# Patient Record
Sex: FEMALE | Race: WHITE | Employment: OTHER | URBAN - METROPOLITAN AREA
[De-identification: names, ages, dates, MRNs, and addresses within clinical notes are randomized per-mention and may not be internally consistent; named-entity substitution may affect disease eponyms.]

---

## 2023-01-01 ENCOUNTER — TELEPHONE (OUTPATIENT)
Dept: PRIMARY CARE | Facility: CLINIC | Age: 88
End: 2023-01-01
Payer: COMMERCIAL

## 2023-01-01 ENCOUNTER — LAB REQUISITION (OUTPATIENT)
Dept: LAB | Facility: HOSPITAL | Age: 88
End: 2023-01-01
Payer: COMMERCIAL

## 2023-01-01 ENCOUNTER — DOCUMENTATION (OUTPATIENT)
Dept: PRIMARY CARE | Facility: CLINIC | Age: 88
End: 2023-01-01
Payer: COMMERCIAL

## 2023-01-01 ENCOUNTER — APPOINTMENT (OUTPATIENT)
Dept: PRIMARY CARE | Facility: CLINIC | Age: 88
End: 2023-01-01
Payer: COMMERCIAL

## 2023-01-01 ENCOUNTER — LAB (OUTPATIENT)
Dept: LAB | Facility: LAB | Age: 88
End: 2023-01-01
Payer: COMMERCIAL

## 2023-01-01 ENCOUNTER — OFFICE VISIT (OUTPATIENT)
Dept: PRIMARY CARE | Facility: CLINIC | Age: 88
End: 2023-01-01
Payer: COMMERCIAL

## 2023-01-01 ENCOUNTER — HOSPITAL ENCOUNTER (OUTPATIENT)
Dept: DATA CONVERSION | Facility: HOSPITAL | Age: 88
End: 2023-05-15
Attending: UROLOGY | Admitting: UROLOGY
Payer: COMMERCIAL

## 2023-01-01 VITALS
OXYGEN SATURATION: 97 % | WEIGHT: 157.8 LBS | HEART RATE: 82 BPM | TEMPERATURE: 97.7 F | RESPIRATION RATE: 16 BRPM | DIASTOLIC BLOOD PRESSURE: 62 MMHG | SYSTOLIC BLOOD PRESSURE: 134 MMHG | BODY MASS INDEX: 27.09 KG/M2

## 2023-01-01 VITALS
SYSTOLIC BLOOD PRESSURE: 144 MMHG | HEART RATE: 89 BPM | BODY MASS INDEX: 27.46 KG/M2 | OXYGEN SATURATION: 95 % | RESPIRATION RATE: 16 BRPM | DIASTOLIC BLOOD PRESSURE: 54 MMHG | TEMPERATURE: 96.8 F | WEIGHT: 160 LBS

## 2023-01-01 VITALS — HEIGHT: 62 IN | WEIGHT: 154.98 LBS | BODY MASS INDEX: 28.52 KG/M2

## 2023-01-01 DIAGNOSIS — E11.311 TYPE 2 DIABETES MELLITUS WITH RETINOPATHY AND MACULAR EDEMA, WITHOUT LONG-TERM CURRENT USE OF INSULIN, UNSPECIFIED LATERALITY, UNSPECIFIED RETINOPATHY SEVERITY (MULTI): ICD-10-CM

## 2023-01-01 DIAGNOSIS — C67.9 MALIGNANT NEOPLASM OF BLADDER, UNSPECIFIED (MULTI): ICD-10-CM

## 2023-01-01 DIAGNOSIS — R31.9 HEMATURIA, UNSPECIFIED: ICD-10-CM

## 2023-01-01 DIAGNOSIS — Z00.00 HEALTH MAINTENANCE EXAMINATION: ICD-10-CM

## 2023-01-01 DIAGNOSIS — E11.9 TYPE 2 DIABETES MELLITUS WITHOUT COMPLICATION, WITHOUT LONG-TERM CURRENT USE OF INSULIN (MULTI): Primary | ICD-10-CM

## 2023-01-01 DIAGNOSIS — R91.8 LUNG MASS: Primary | ICD-10-CM

## 2023-01-01 DIAGNOSIS — I10 ESSENTIAL HYPERTENSION: ICD-10-CM

## 2023-01-01 DIAGNOSIS — R30.0 DYSURIA: ICD-10-CM

## 2023-01-01 DIAGNOSIS — Z00.00 HEALTHCARE MAINTENANCE: ICD-10-CM

## 2023-01-01 DIAGNOSIS — N39.0 URINARY TRACT INFECTION, SITE NOT SPECIFIED: ICD-10-CM

## 2023-01-01 DIAGNOSIS — E11.9 TYPE 2 DIABETES MELLITUS WITHOUT COMPLICATION, WITHOUT LONG-TERM CURRENT USE OF INSULIN (MULTI): ICD-10-CM

## 2023-01-01 DIAGNOSIS — R31.0 GROSS HEMATURIA: Primary | ICD-10-CM

## 2023-01-01 DIAGNOSIS — I10 WHITE COAT SYNDROME WITH HYPERTENSION: Primary | ICD-10-CM

## 2023-01-01 DIAGNOSIS — R31.0 GROSS HEMATURIA: Primary | Chronic | ICD-10-CM

## 2023-01-01 DIAGNOSIS — K64.0 GRADE I HEMORRHOIDS: Primary | ICD-10-CM

## 2023-01-01 DIAGNOSIS — R30.0 DYSURIA: Primary | ICD-10-CM

## 2023-01-01 DIAGNOSIS — C68.9 UROTHELIAL CARCINOMA (MULTI): Primary | ICD-10-CM

## 2023-01-01 DIAGNOSIS — E11.8 TYPE 2 DIABETES MELLITUS WITH UNSPECIFIED COMPLICATIONS (MULTI): ICD-10-CM

## 2023-01-01 DIAGNOSIS — N21.0 CALCULUS IN BLADDER: ICD-10-CM

## 2023-01-01 DIAGNOSIS — Z01.818 PRE-OP EVALUATION: Primary | ICD-10-CM

## 2023-01-01 DIAGNOSIS — R54 ADVANCED AGE: ICD-10-CM

## 2023-01-01 DIAGNOSIS — D49.4 NEOPLASM OF UNSPECIFIED BEHAVIOR OF BLADDER: ICD-10-CM

## 2023-01-01 DIAGNOSIS — E11.29 TYPE 2 DIABETES MELLITUS WITH OTHER KIDNEY COMPLICATION, UNSPECIFIED WHETHER LONG TERM INSULIN USE (MULTI): ICD-10-CM

## 2023-01-01 DIAGNOSIS — I10 ESSENTIAL (PRIMARY) HYPERTENSION: ICD-10-CM

## 2023-01-01 DIAGNOSIS — R35.0 FREQUENCY OF URINATION: Primary | ICD-10-CM

## 2023-01-01 DIAGNOSIS — R93.41 ABNORMAL RADIOLOGIC FINDINGS ON DIAGNOSTIC IMAGING OF RENAL PELVIS, URETER, OR BLADDER: ICD-10-CM

## 2023-01-01 DIAGNOSIS — Z00.00 ANNUAL PHYSICAL EXAM: Primary | ICD-10-CM

## 2023-01-01 DIAGNOSIS — R31.0 GROSS HEMATURIA: Chronic | ICD-10-CM

## 2023-01-01 DIAGNOSIS — I10 ESSENTIAL HYPERTENSION: Primary | ICD-10-CM

## 2023-01-01 LAB
ABO GROUP (TYPE) IN BLOOD: NORMAL
ACTIVATED PARTIAL THROMBOPLASTIN TIME IN PPP BY COAGULATION ASSAY: 30 SEC (ref 26–39)
ACTIVATED PARTIAL THROMBOPLASTIN TIME IN PPP BY COAGULATION ASSAY: 33 SEC (ref 27–38)
ALANINE AMINOTRANSFERASE (SGPT) (U/L) IN SER/PLAS: 14 U/L (ref 7–45)
ALBUMIN (G/DL) IN SER/PLAS: 4.3 G/DL (ref 3.4–5)
ALKALINE PHOSPHATASE (U/L) IN SER/PLAS: 53 U/L (ref 33–136)
ANION GAP IN SER/PLAS: 12 MMOL/L (ref 10–20)
ANION GAP IN SER/PLAS: 12 MMOL/L (ref 10–20)
ANION GAP IN SER/PLAS: 17 MMOL/L (ref 10–20)
ANTIBODY SCREEN: NORMAL
APPEARANCE UR: ABNORMAL
APPEARANCE, URINE: NORMAL
ASPARTATE AMINOTRANSFERASE (SGOT) (U/L) IN SER/PLAS: 19 U/L (ref 9–39)
BACTERIA UR CULT: NORMAL
BACTERIA, URINE: ABNORMAL /HPF
BILIRUB UR STRIP.AUTO-MCNC: NEGATIVE MG/DL
BILIRUBIN TOTAL (MG/DL) IN SER/PLAS: 0.4 MG/DL (ref 0–1.2)
BUDDING YEAST, URINE: PRESENT /HPF
CALCIUM (MG/DL) IN SER/PLAS: 9.1 MG/DL (ref 8.6–10.3)
CALCIUM (MG/DL) IN SER/PLAS: 9.3 MG/DL (ref 8.6–10.3)
CALCIUM (MG/DL) IN SER/PLAS: 9.9 MG/DL (ref 8.6–10.3)
CARBON DIOXIDE, TOTAL (MMOL/L) IN SER/PLAS: 25 MMOL/L (ref 21–32)
CARBON DIOXIDE, TOTAL (MMOL/L) IN SER/PLAS: 25 MMOL/L (ref 21–32)
CARBON DIOXIDE, TOTAL (MMOL/L) IN SER/PLAS: 27 MMOL/L (ref 21–32)
CHLORIDE (MMOL/L) IN SER/PLAS: 100 MMOL/L (ref 98–107)
CHLORIDE (MMOL/L) IN SER/PLAS: 98 MMOL/L (ref 98–107)
CHLORIDE (MMOL/L) IN SER/PLAS: 98 MMOL/L (ref 98–107)
COLOR UR: ABNORMAL
COLOR, URINE: NORMAL
COMPLETE PATHOLOGY REPORT: NORMAL
CONVERTED CLINICAL DIAGNOSIS-HISTORY: NORMAL
CONVERTED FINAL DIAGNOSIS: NORMAL
CONVERTED FINAL REPORT PDF LINK TO COPY AND PASTE: NORMAL
CONVERTED GROSS DESCRIPTION: NORMAL
CREATININE (MG/DL) IN SER/PLAS: 1.21 MG/DL (ref 0.5–1.05)
CREATININE (MG/DL) IN SER/PLAS: 1.29 MG/DL (ref 0.5–1.05)
CREATININE (MG/DL) IN SER/PLAS: 1.36 MG/DL (ref 0.5–1.05)
ERYTHROCYTE DISTRIBUTION WIDTH (RATIO) BY AUTOMATED COUNT: 14.1 % (ref 11.5–14.5)
ERYTHROCYTE DISTRIBUTION WIDTH (RATIO) BY AUTOMATED COUNT: 14.4 % (ref 11.5–14.5)
ERYTHROCYTE DISTRIBUTION WIDTH (RATIO) BY AUTOMATED COUNT: 15.1 % (ref 11.5–14.5)
ERYTHROCYTE DISTRIBUTION WIDTH (RATIO) BY AUTOMATED COUNT: 15.2 % (ref 11.5–14.5)
ERYTHROCYTE MEAN CORPUSCULAR HEMOGLOBIN CONCENTRATION (G/DL) BY AUTOMATED: 29.5 G/DL (ref 32–36)
ERYTHROCYTE MEAN CORPUSCULAR HEMOGLOBIN CONCENTRATION (G/DL) BY AUTOMATED: 29.9 G/DL (ref 32–36)
ERYTHROCYTE MEAN CORPUSCULAR HEMOGLOBIN CONCENTRATION (G/DL) BY AUTOMATED: 29.9 G/DL (ref 32–36)
ERYTHROCYTE MEAN CORPUSCULAR HEMOGLOBIN CONCENTRATION (G/DL) BY AUTOMATED: 30.1 G/DL (ref 32–36)
ERYTHROCYTE MEAN CORPUSCULAR VOLUME (FL) BY AUTOMATED COUNT: 73 FL (ref 80–100)
ERYTHROCYTE MEAN CORPUSCULAR VOLUME (FL) BY AUTOMATED COUNT: 79 FL (ref 80–100)
ERYTHROCYTE MEAN CORPUSCULAR VOLUME (FL) BY AUTOMATED COUNT: 80 FL (ref 80–100)
ERYTHROCYTE MEAN CORPUSCULAR VOLUME (FL) BY AUTOMATED COUNT: 84 FL (ref 80–100)
ERYTHROCYTES (10*6/UL) IN BLOOD BY AUTOMATED COUNT: 3.97 X10E12/L (ref 4–5.2)
ERYTHROCYTES (10*6/UL) IN BLOOD BY AUTOMATED COUNT: 4.1 X10E12/L (ref 4–5.2)
ERYTHROCYTES (10*6/UL) IN BLOOD BY AUTOMATED COUNT: 4.23 X10E12/L (ref 4–5.2)
ERYTHROCYTES (10*6/UL) IN BLOOD BY AUTOMATED COUNT: 4.3 X10E12/L (ref 4–5.2)
GFR FEMALE: 37 ML/MIN/1.73M2
GFR FEMALE: 39 ML/MIN/1.73M2
GFR FEMALE: 42 ML/MIN/1.73M2
GLUCOSE (MG/DL) IN SER/PLAS: 254 MG/DL (ref 74–99)
GLUCOSE (MG/DL) IN SER/PLAS: 266 MG/DL (ref 74–99)
GLUCOSE (MG/DL) IN SER/PLAS: 277 MG/DL (ref 74–99)
GLUCOSE UR STRIP.AUTO-MCNC: NEGATIVE MG/DL
HEMATOCRIT (%) IN BLOOD BY AUTOMATED COUNT: 28.8 % (ref 36–46)
HEMATOCRIT (%) IN BLOOD BY AUTOMATED COUNT: 32.6 % (ref 36–46)
HEMATOCRIT (%) IN BLOOD BY AUTOMATED COUNT: 33.5 % (ref 36–46)
HEMATOCRIT (%) IN BLOOD BY AUTOMATED COUNT: 36.1 % (ref 36–46)
HEMOGLOBIN (G/DL) IN BLOOD: 10 G/DL (ref 12–16)
HEMOGLOBIN (G/DL) IN BLOOD: 10.8 G/DL (ref 12–16)
HEMOGLOBIN (G/DL) IN BLOOD: 8.5 G/DL (ref 12–16)
HEMOGLOBIN (G/DL) IN BLOOD: 9.8 G/DL (ref 12–16)
INR IN PPP BY COAGULATION ASSAY: 1.1 (ref 0.9–1.1)
INR IN PPP BY COAGULATION ASSAY: 1.1 (ref 0.9–1.1)
KETONES UR STRIP.AUTO-MCNC: ABNORMAL MG/DL
LEUKOCYTE ESTERASE UR QL STRIP.AUTO: ABNORMAL
LEUKOCYTES (10*3/UL) IN BLOOD BY AUTOMATED COUNT: 11.3 X10E9/L (ref 4.4–11.3)
LEUKOCYTES (10*3/UL) IN BLOOD BY AUTOMATED COUNT: 8 X10E9/L (ref 4.4–11.3)
LEUKOCYTES (10*3/UL) IN BLOOD BY AUTOMATED COUNT: 8.3 X10E9/L (ref 4.4–11.3)
LEUKOCYTES (10*3/UL) IN BLOOD BY AUTOMATED COUNT: 8.8 X10E9/L (ref 4.4–11.3)
MUCUS, URINE: ABNORMAL /LPF
NITRITE UR QL STRIP.AUTO: NEGATIVE
NRBC (PER 100 WBCS) BY AUTOMATED COUNT: 0 /100 WBC (ref 0–0)
NRBC (PER 100 WBCS) BY AUTOMATED COUNT: 0 /100 WBC (ref 0–0)
PH UR STRIP.AUTO: 6 [PH]
PLATELETS (10*3/UL) IN BLOOD AUTOMATED COUNT: 284 X10E9/L (ref 150–450)
PLATELETS (10*3/UL) IN BLOOD AUTOMATED COUNT: 298 X10E9/L (ref 150–450)
PLATELETS (10*3/UL) IN BLOOD AUTOMATED COUNT: 300 X10E9/L (ref 150–450)
PLATELETS (10*3/UL) IN BLOOD AUTOMATED COUNT: 321 X10E9/L (ref 150–450)
POCT GLUCOSE: 231 MG/DL (ref 74–99)
POTASSIUM (MMOL/L) IN SER/PLAS: 4.6 MMOL/L (ref 3.5–5.3)
PROT UR STRIP.AUTO-MCNC: ABNORMAL MG/DL
PROTEIN TOTAL: 7.3 G/DL (ref 6.4–8.2)
PROTHROMBIN TIME (PT) IN PPP BY COAGULATION ASSAY: 12.1 SEC (ref 9.8–12.8)
PROTHROMBIN TIME (PT) IN PPP BY COAGULATION ASSAY: 12.4 SEC (ref 9.8–13.4)
RBC # UR STRIP.AUTO: ABNORMAL /UL
RBC #/AREA URNS AUTO: >20 /HPF
RBC, URINE: >182 /HPF (ref 0–5)
RBC, URINE: >182 /HPF (ref 0–5)
RH FACTOR: NORMAL
SODIUM (MMOL/L) IN SER/PLAS: 132 MMOL/L (ref 136–145)
SODIUM (MMOL/L) IN SER/PLAS: 132 MMOL/L (ref 136–145)
SODIUM (MMOL/L) IN SER/PLAS: 135 MMOL/L (ref 136–145)
SP GR UR STRIP.AUTO: 1.02
THYROTROPIN (MIU/L) IN SER/PLAS BY DETECTION LIMIT <= 0.05 MIU/L: 1.85 MIU/L (ref 0.44–3.98)
UREA NITROGEN (MG/DL) IN SER/PLAS: 22 MG/DL (ref 6–23)
UREA NITROGEN (MG/DL) IN SER/PLAS: 24 MG/DL (ref 6–23)
UREA NITROGEN (MG/DL) IN SER/PLAS: 31 MG/DL (ref 6–23)
URINE CULTURE: NO GROWTH
URINE CULTURE: NORMAL
UROBILINOGEN UR STRIP.AUTO-MCNC: <2 MG/DL
UROBILINOGEN, URINE: NORMAL MG/DL (ref 0–1.9)
WBC #/AREA URNS AUTO: >50 /HPF
WBC CLUMPS #/AREA URNS AUTO: ABNORMAL /HPF
WBC, URINE: >182 /HPF (ref 0–5)
WBC, URINE: ABNORMAL /HPF (ref 0–5)

## 2023-01-01 PROCEDURE — 36415 COLL VENOUS BLD VENIPUNCTURE: CPT

## 2023-01-01 PROCEDURE — 99214 OFFICE O/P EST MOD 30 MIN: CPT | Performed by: INTERNAL MEDICINE

## 2023-01-01 PROCEDURE — 1036F TOBACCO NON-USER: CPT | Performed by: INTERNAL MEDICINE

## 2023-01-01 PROCEDURE — 1160F RVW MEDS BY RX/DR IN RCRD: CPT | Performed by: INTERNAL MEDICINE

## 2023-01-01 PROCEDURE — 3077F SYST BP >= 140 MM HG: CPT | Performed by: INTERNAL MEDICINE

## 2023-01-01 PROCEDURE — 3078F DIAST BP <80 MM HG: CPT | Performed by: INTERNAL MEDICINE

## 2023-01-01 PROCEDURE — 99215 OFFICE O/P EST HI 40 MIN: CPT | Performed by: INTERNAL MEDICINE

## 2023-01-01 PROCEDURE — 84443 ASSAY THYROID STIM HORMONE: CPT

## 2023-01-01 PROCEDURE — 87086 URINE CULTURE/COLONY COUNT: CPT

## 2023-01-01 PROCEDURE — 85027 COMPLETE CBC AUTOMATED: CPT

## 2023-01-01 PROCEDURE — 3075F SYST BP GE 130 - 139MM HG: CPT | Performed by: INTERNAL MEDICINE

## 2023-01-01 PROCEDURE — 81001 URINALYSIS AUTO W/SCOPE: CPT

## 2023-01-01 PROCEDURE — 1159F MED LIST DOCD IN RCRD: CPT | Performed by: INTERNAL MEDICINE

## 2023-01-01 PROCEDURE — 80053 COMPREHEN METABOLIC PANEL: CPT

## 2023-01-01 RX ORDER — METOPROLOL TARTRATE 25 MG/1
25 TABLET, FILM COATED ORAL 2 TIMES DAILY
Qty: 60 TABLET | Refills: 0 | Status: SHIPPED | OUTPATIENT
Start: 2023-01-01 | End: 2023-11-17

## 2023-01-01 RX ORDER — IBUPROFEN 600 MG/1
600 TABLET ORAL
COMMUNITY
Start: 2008-08-14

## 2023-01-01 RX ORDER — LISINOPRIL 20 MG/1
20 TABLET ORAL DAILY
Qty: 90 TABLET | Refills: 3 | Status: SHIPPED | OUTPATIENT
Start: 2023-01-01 | End: 2023-11-17

## 2023-01-01 RX ORDER — NITROFURANTOIN 25; 75 MG/1; MG/1
100 CAPSULE ORAL 2 TIMES DAILY
Qty: 10 CAPSULE | Refills: 0 | Status: SHIPPED | OUTPATIENT
Start: 2023-01-01 | End: 2023-01-01

## 2023-01-01 RX ORDER — LISINOPRIL 30 MG/1
1 TABLET ORAL DAILY
COMMUNITY
Start: 2019-10-15 | End: 2023-01-01 | Stop reason: SINTOL

## 2023-01-01 RX ORDER — CIPROFLOXACIN 500 MG/1
500 TABLET ORAL 2 TIMES DAILY
COMMUNITY
Start: 2023-01-01 | End: 2023-01-01 | Stop reason: SDUPTHER

## 2023-01-01 RX ORDER — BENZONATATE 100 MG/1
100 CAPSULE ORAL 3 TIMES DAILY PRN
COMMUNITY

## 2023-01-01 RX ORDER — RANIBIZUMAB-EQRN 0.5 MG/.05ML
1 INJECTION, SOLUTION INTRAVITREAL
COMMUNITY
Start: 2023-01-01

## 2023-01-01 RX ORDER — PHENAZOPYRIDINE HYDROCHLORIDE 200 MG/1
1 TABLET, FILM COATED ORAL 3 TIMES DAILY
COMMUNITY
Start: 2023-01-01

## 2023-01-01 RX ORDER — LEVOCETIRIZINE DIHYDROCHLORIDE 5 MG/1
5 TABLET, FILM COATED ORAL DAILY PRN
COMMUNITY
Start: 2021-10-21

## 2023-01-01 RX ORDER — HYDROCORTISONE 25 MG/G
CREAM TOPICAL 2 TIMES DAILY
Qty: 30 G | Refills: 0 | Status: SHIPPED | OUTPATIENT
Start: 2023-01-01 | End: 2023-01-01

## 2023-01-01 RX ORDER — HYDROCORTISONE ACETATE 25 MG/1
25 SUPPOSITORY RECTAL 2 TIMES DAILY
Qty: 28 SUPPOSITORY | Refills: 1 | Status: SHIPPED | OUTPATIENT
Start: 2023-01-01 | End: 2023-01-01

## 2023-01-01 RX ORDER — ACETAMINOPHEN 500 MG
TABLET ORAL DAILY
COMMUNITY

## 2023-01-01 RX ORDER — CIPROFLOXACIN 250 MG/1
250 TABLET, FILM COATED ORAL 2 TIMES DAILY
Qty: 14 TABLET | Refills: 0 | Status: SHIPPED | OUTPATIENT
Start: 2023-01-01 | End: 2023-01-01

## 2023-01-01 RX ORDER — SITAGLIPTIN 100 MG/1
TABLET, FILM COATED ORAL
Qty: 90 TABLET | Refills: 0 | Status: SHIPPED | OUTPATIENT
Start: 2023-01-01 | End: 2023-01-01 | Stop reason: SDUPTHER

## 2023-01-01 RX ORDER — METOPROLOL TARTRATE 25 MG/1
25 TABLET, FILM COATED ORAL 2 TIMES DAILY
Qty: 60 TABLET | Refills: 1 | Status: SHIPPED | OUTPATIENT
Start: 2023-01-01 | End: 2023-01-01

## 2023-01-01 RX ORDER — LATANOPROST 50 UG/ML
1 SOLUTION/ DROPS OPHTHALMIC NIGHTLY
COMMUNITY
Start: 2017-12-01

## 2023-01-01 RX ORDER — METOPROLOL TARTRATE 25 MG/1
1 TABLET, FILM COATED ORAL 2 TIMES DAILY
COMMUNITY
Start: 2023-01-01 | End: 2023-01-01 | Stop reason: SDUPTHER

## 2023-01-01 RX ORDER — SYRING-NEEDL,DISP,INSUL,0.3 ML 29 G X1/2"
SYRINGE, EMPTY DISPOSABLE MISCELLANEOUS ONCE
COMMUNITY

## 2023-01-01 RX ORDER — MELOXICAM 15 MG/1
1 TABLET ORAL DAILY
COMMUNITY
Start: 2017-12-01 | End: 2023-01-01 | Stop reason: ALTCHOICE

## 2023-01-01 RX ORDER — CIPROFLOXACIN 500 MG/1
500 TABLET ORAL 2 TIMES DAILY
Qty: 14 TABLET | Refills: 0 | Status: SHIPPED | OUTPATIENT
Start: 2023-01-01 | End: 2023-01-01

## 2023-01-01 RX ORDER — ACETAMINOPHEN 500 MG
TABLET ORAL EVERY 6 HOURS PRN
COMMUNITY

## 2023-01-01 ASSESSMENT — ENCOUNTER SYMPTOMS
APPETITE CHANGE: 0
PALPITATIONS: 0
SHORTNESS OF BREATH: 0
HEMATURIA: 1
ACTIVITY CHANGE: 0
ACTIVITY CHANGE: 0
PALPITATIONS: 0
COUGH: 0
SHORTNESS OF BREATH: 0
APPETITE CHANGE: 0
COUGH: 0

## 2023-01-01 ASSESSMENT — PATIENT HEALTH QUESTIONNAIRE - PHQ9: 2. FEELING DOWN, DEPRESSED OR HOPELESS: NOT AT ALL

## 2023-02-15 PROBLEM — H26.9 CATARACT: Status: ACTIVE | Noted: 2023-01-01

## 2023-02-15 PROBLEM — R09.82 POSTNASAL DRIP: Status: ACTIVE | Noted: 2023-01-01

## 2023-02-15 PROBLEM — H69.90 EUSTACHIAN TUBE DYSFUNCTION: Status: ACTIVE | Noted: 2023-01-01

## 2023-02-15 PROBLEM — K59.00 CONSTIPATION: Status: ACTIVE | Noted: 2023-01-01

## 2023-02-15 PROBLEM — E11.9 TYPE 2 DIABETES MELLITUS (MULTI): Status: ACTIVE | Noted: 2023-01-01

## 2023-02-15 PROBLEM — M19.90 OSTEOARTHRITIS: Status: ACTIVE | Noted: 2023-01-01

## 2023-02-15 PROBLEM — R30.0 DYSURIA: Status: ACTIVE | Noted: 2023-01-01

## 2023-02-15 PROBLEM — J30.9 ALLERGIC RHINITIS: Status: ACTIVE | Noted: 2023-01-01

## 2023-02-15 PROBLEM — M54.6 MIDLINE THORACIC BACK PAIN: Status: ACTIVE | Noted: 2023-01-01

## 2023-02-15 PROBLEM — R54 ADVANCED AGE: Status: ACTIVE | Noted: 2023-01-01

## 2023-02-15 PROBLEM — I10 ESSENTIAL HYPERTENSION: Status: ACTIVE | Noted: 2023-01-01

## 2023-02-15 PROBLEM — I10 WHITE COAT SYNDROME WITH HYPERTENSION: Status: ACTIVE | Noted: 2023-01-01

## 2023-02-15 PROBLEM — H40.9 GLAUCOMA OF BOTH EYES: Status: ACTIVE | Noted: 2023-01-01

## 2023-02-15 PROBLEM — J06.9 ACUTE URI: Status: ACTIVE | Noted: 2023-01-01

## 2023-02-15 PROBLEM — R10.9 LEFT FLANK PAIN: Status: ACTIVE | Noted: 2023-01-01

## 2023-03-27 PROBLEM — R31.0 GROSS HEMATURIA: Status: ACTIVE | Noted: 2023-01-01

## 2023-03-27 PROBLEM — J06.9 ACUTE URI: Status: RESOLVED | Noted: 2023-01-01 | Resolved: 2023-01-01

## 2023-03-27 NOTE — PROGRESS NOTES
Subjective   Patient ID: Kaiden Valdez is a 89 y.o. female who presents for Follow-up.  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   Respiratory:  Negative for cough and shortness of breath.    Cardiovascular:  Negative for chest pain, palpitations and leg swelling.   Genitourinary:  Positive for hematuria.       Objective   Physical Exam  Vitals and nursing note reviewed.   Cardiovascular:      Rate and Rhythm: Normal rate and regular rhythm.   Pulmonary:      Effort: Pulmonary effort is normal.      Breath sounds: Normal breath sounds.         Assessment/Plan   Problem List Items Addressed This Visit       Advanced age    Essential hypertension    Current Assessment & Plan     Will decrease Lisinopril since pt is getting dizzy         Relevant Medications    lisinopril 20 mg tablet    Type 2 diabetes mellitus (CMS/[facility])    Current Assessment & Plan     Stable         Relevant Orders    POCT glycosylated hemoglobin (Hb A1C) manually resulted    Gross hematuria - Primary    Current Assessment & Plan     In 9/21 we had started a workup for this - renal us was negative  She never followed through with urology appointment    According to the granddaughter (who I spoke with by phone) pt is willing to follow up with Urology at this time -           Relevant Orders    Urinalysis with Reflex Microscopic (Completed)    Urine Culture     Other Visit Diagnoses       Health maintenance examination        Relevant Orders    TSH with reflex to Free T4 if abnormal (Completed)    CBC (Completed)    Healthcare maintenance        Relevant Orders    Comprehensive Metabolic Panel (Completed)

## 2023-03-27 NOTE — PROGRESS NOTES
The patient is here today for a follow up appointment.  The patient had Covid just over 2 weeks ago.

## 2023-03-28 NOTE — ASSESSMENT & PLAN NOTE
In 9/21 we had started a workup for this - renal us was negative  She never followed through with urology appointment    According to the granddaughter (who I spoke with by phone) pt is willing to follow up with Urology at this time -

## 2023-04-27 NOTE — PROGRESS NOTES
Spoke with granddaughter Luisjeanne  Pt with Bladder cancer  Saw Dr Messina - planned for a transurethral resection of bladder tumor, ureteroscopy and stent placement    Encouraged to follow through on this with the aim to symptom reduction and palliation -  Outpatient procedure    Will repeat CBC for now    She has a small pancreatic lesion - will not pursue workup

## 2023-05-03 PROBLEM — D50.0 IRON DEFICIENCY ANEMIA DUE TO CHRONIC BLOOD LOSS: Status: ACTIVE | Noted: 2023-01-01

## 2023-05-03 NOTE — PROGRESS NOTES
The patient is here today for a follow up appointment.Subjective   Patient ID: Kaiden Valdez is a 89 y.o. female who presents for Follow-up.  89-year-old white female here today with daughter and granddaughter for follow-up of her recent testing. Citizen of [place]  Speaking patient conversation translated by granddaughter who is a medical assistant.  I had seen her 6 weeks ago with hematuria and we had sent her for a CAT scan as part of that work-up.  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  She saw Dr. Messina in follow-up.  Dr. Messina discussed care with the daughter and granddaughter.  However neither of them told patient that the most likely diagnosis here is a urothelial cancer.  The  patient is unaware that this is likely a cancer.  Dr. Messina note was reviewed.  He is planned to transurethral resection of the bladder tumor as well as biopsy of the ureteropelvic tumor.  And a ureteral stent placement.  This is scheduled for May 15.    Patient reports she is very tired.  She normally does all of her housework even though she lives with her daughter.  She is unable to do that without resting frequently.  She is not lightheaded or dizzy.  She is taking all her medications regularly.              Review of Systems   Constitutional:  Negative for activity change and appetite change.   Respiratory:  Negative for cough and shortness of breath.    Cardiovascular:  Negative for chest pain, palpitations and leg swelling.       Objective   Physical Exam  Vitals and nursing note reviewed.   Cardiovascular:      Rate and Rhythm: Normal rate and regular rhythm.   Pulmonary:      Effort: Pulmonary effort is normal.      Breath sounds: Normal breath sounds.         Assessment/Plan   Likely bladder cancer.    I have discussed this in length at length with the patient today I have shared with her that this is most likely a cancerous tumor.  The goal here would be to  actually remove the tumor and decrease hematuria therefore improving her quality of life.  The procedure planned would not be curative.  The patient understands this.  In because she is so tired I will repeat a hemoglobin today to assess the anemia.  Both daughter and granddaughter understand that they should take her to the emergency room if she gets worse.  Otherwise I will see her back in 1 month  #2 hypertension stable if she gets dizzy granddaughter knows to call me and we can drop down her blood pressure medication   #3 diabetes on sitagliptin we will continue for now

## 2023-05-09 NOTE — TELEPHONE ENCOUNTER
[facility] was requesting clarification on the Januvia 100 mg, the rx from 5/8/23 was for one full tablet and the one approved today was for 1/2 tablet daily. Please advise

## 2023-05-10 NOTE — TELEPHONE ENCOUNTER
[facility] garcia was requesting a new rx for Januvia 50 mg every day, the last rx for 100 mg 1/2 tablet daily - the tablet is not made to be cut in half. This was entered for approval

## 2023-06-04 NOTE — TELEPHONE ENCOUNTER
Granddaughter called with concerns about a possible UTI.  No fevers or chills but definitely abnormal appearing urine and other symptoms suggestive of UTI.  Cipro has been tolerated before.  We will use that as and follow-up with concerns.  She will push fluids as well

## 2023-06-15 NOTE — TELEPHONE ENCOUNTER
Rob would like to know if you can review the MRI of the pancrease. The patient is having episodes where she is not eating and also urinating.  There is a call into the surgeons office but there is no return call.  Also should she schedule for a cardiac clearance appointment for the upcoming appointment.

## 2023-06-15 NOTE — TELEPHONE ENCOUNTER
Discussed  No further santana at this time on pancreas  Rec Cardiology appt in prep for surgery as well as a carotid US

## 2023-06-28 NOTE — PROGRESS NOTES
I spoke with Dr. Gill as well and is Dr. Messina today.  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  The periaortic lymphadenopathy has also increased.  Dr. Messina feels that this makes her a poor candidate for surgery.  Dr. Gill feels that she is at moderate risk at best given her age to have surgery.  Discussed her care with her granddaughter Rob.  I am not recommending the move forward with surgery I would consider palliative care/hospice granddaughter will discuss with patient and let us know what she would like to do.

## 2023-07-24 PROBLEM — N28.89 URETERAL MASS: Status: ACTIVE | Noted: 2023-01-01

## 2023-07-24 PROBLEM — C67.9 BLADDER CANCER (MULTI): Status: ACTIVE | Noted: 2023-01-01

## 2023-07-24 PROBLEM — R01.1 HEART MURMUR: Status: ACTIVE | Noted: 2023-01-01

## 2023-07-24 PROBLEM — R35.0 FREQUENCY OF URINATION: Status: ACTIVE | Noted: 2023-01-01

## 2023-07-24 PROBLEM — H40.9 UNSPECIFIED GLAUCOMA: Status: ACTIVE | Noted: 2023-01-01

## 2023-07-24 PROBLEM — R35.1 NOCTURIA: Status: ACTIVE | Noted: 2023-01-01

## 2023-08-23 NOTE — TELEPHONE ENCOUNTER
She is uncomfortable, she has hemorrhoids and was hoping for suppositories and cream to be called in to [facility].   Update is that she spends a lot of time resting, laying down, gets up and spends some time sitting outside, no longer goes anywhere due to severe fatigue and dizziness. She has leg pain wondered if she can take Aleeve.

## 2023-09-07 NOTE — TELEPHONE ENCOUNTER
For the past week she has been having the urge to urinate every 10 minutes and not emptying completely, yesterday she was exhausted and disoriented, we think she has another uti,  Please advise if you can send in an antibiotic for her to [facility]. We can submit a urine sample if need be tomorrow.

## 2023-09-07 NOTE — TELEPHONE ENCOUNTER
DW Granddaughter  Will refill Cipro even though I suspect symptoms are related to progression of disease.  Encouraged to get Hospice involved since there is a definite decline in functioning.

## 2023-09-14 NOTE — H&P
History & Physical Reviewed:   I have reviewed the History and Physical dated:  15-May-2023   History and Physical reviewed and relevant findings noted. Patient examined to review pertinent physical  findings.: No significant changes   Home Medications Reviewed: no changes noted   Allergies Reviewed: no changes noted       ERAS (Enhanced Recovery After Surgery):  ·  ERAS Patient: no     Consent:   COVID-19 Consent:  ·  COVID-19 Risk Consent Surgeon has reviewed key risks related to the risk of [place] COVID-19 and if they contract COVID-19 what the risks are.     Attestation:   Note Completion:  I am a:  Resident/Fellow   Attending Attestation I saw and evaluated the patient.  I personally obtained the key and critical portions of the history and physical exam or was physically present for key and  critical portions performed by the resident/fellow. I reviewed the resident/fellow?s documentation and discussed the patient with the resident/fellow.  I agree with the resident/fellow?s medical decision making as documented in the note.     I personally evaluated the patient on 15-May-2023         Electronic Signatures:  Devante Stovall (Resident))  (Signed 15-May-2023 07:03)   Authored: History & Physical Reviewed, ERAS, Consent,  Note Completion  Jian Messina)  (Signed 15-May-2023 12:03)   Authored: Note Completion   Co-Signer: History & Physical Reviewed, ERAS, Consent, Note Completion      Last Updated: 15-May-2023 12:03 by Jian Messina)

## 2023-09-26 NOTE — TELEPHONE ENCOUNTER
She was on antibiotics a little bit ago and felt good, now the burning upon urination is back, she is very tired and she has the urgency and frequency again.    Also she finally agreed to have Hospice start, she is okay with someone coming to the house sometimes but she wants to also keep you as her doctor.

## 2023-10-02 NOTE — OP NOTE
Post Operative Note:     PreOp Diagnosis: Bladder mass, left ureteral mass   Post-Procedure Diagnosis: Same   Procedure: 1.  Cystoscopy and left retrograde pyelogram  2.  Left ureteroscopy with biopsy  3.  TURBT (large)   Surgeon: Jian Messina MD   Resident/Fellow/Other Assistant: None   Anesthesia: General   Estimated Blood Loss (mL): Minimal   Specimen: yes   Findings: Extensive papillary changes along posterior  wall and left lateral wall extending from trigone to 3 o'clock position on the left lateral wall, tumor emanating from the left ureteral orifice     Operative Report Dictated:  Dictation: not applicable - note contains Operative  Report   Operative Report:    Indications: 89-year-old female presented with hematuria.  CT urogram revealed extensive tumor in the bladder as well as the left ureter.  Presents today for resection  and left ureteroscopy with biopsy and possible stent placement.  The risk, benefits, and alternatives were discussed with the patient.  Informed consent was obtained.    Procedure: Patient was brought to the operating room and placed in the supine position.  General anesthesia was induced.  Once anesthetized, her legs were placed in the dorsolithotomy position.  Her genitalia were prepped and draped in the usual sterile  fashion.  A 22 Syrian cystoscope was passed atraumatically per the urethra and the bladder was inspected.  There was a large amount of clot in the bladder.  This was removed with an Ellik evacuator.  We then inspected the bladder.  Along the posterior  wall there were two 1.5 cm papillary tumors.  There was extensive papillary change along the floor of the trigone and extending up the left lateral wall to approximately the 3 o'clock position.  This area measured at least 3 cm in size.  There was tumor  also emanating from the left ureteral orifice.  We began by exchanging the cystoscope for a 26 Syrian continuous-flow resectoscope.  We resected the posterior  tumors down to detrusor fibers.  Spot cautery was used to the obtain hemostasis.  We then resected  the trigone and left lateral wall.  We again used cautery to obtain hemostasis.  Specimen was collected and sent to pathology as bladder tumor.  We then cannulated the left ureteral orifice and performed a retrograde pyelogram.  There was a large filling  defect in the distal ureter extending from the ureterovesical junction all the way up to approximately 4 cm above the junction.  No contrast passed above this.  We made multiple attempts to pass a 0.35 sensor wire as well as a 0.35 Glidewire unsuccessfully.   The sensor wire was left in the ureter and the cystoscope and open-ended catheter removed.  We then passed a 6 Bulgarian rigid ureteroscope atraumatically.  We cannulated the left ureteral orifice.  We biopsied the mass using a Hilton basket.  Biopsy was  sent to pathology as ureteral mass.  At this point the ureteroscope was removed.  We reinserted the resectoscope and extensively cauterized the resection beds.  Hemostasis was obtained.  A 22 Bulgarian Story was placed and connected to gravity drainage.   Patient was awakened and taken to the PACU in stable condition.      Electronic Signatures:  Jian Messina)  (Signed 15-May-2023 09:50)   Authored: Post Operative Note, Note Completion      Last Updated: 15-May-2023 09:50 by Jian Messina)